# Patient Record
Sex: MALE | Race: WHITE | NOT HISPANIC OR LATINO | Employment: OTHER | URBAN - NONMETROPOLITAN AREA
[De-identification: names, ages, dates, MRNs, and addresses within clinical notes are randomized per-mention and may not be internally consistent; named-entity substitution may affect disease eponyms.]

---

## 2022-01-07 ENCOUNTER — OFFICE VISIT (OUTPATIENT)
Dept: INTERNAL MEDICINE | Facility: CLINIC | Age: 72
End: 2022-01-07

## 2022-01-07 VITALS
OXYGEN SATURATION: 98 % | BODY MASS INDEX: 33.34 KG/M2 | HEART RATE: 81 BPM | HEIGHT: 68 IN | SYSTOLIC BLOOD PRESSURE: 148 MMHG | DIASTOLIC BLOOD PRESSURE: 78 MMHG | TEMPERATURE: 98.6 F | WEIGHT: 220 LBS | RESPIRATION RATE: 16 BRPM

## 2022-01-07 DIAGNOSIS — J02.9 PHARYNGITIS, UNSPECIFIED ETIOLOGY: ICD-10-CM

## 2022-01-07 DIAGNOSIS — R05.9 COUGH: Primary | ICD-10-CM

## 2022-01-07 PROCEDURE — 99203 OFFICE O/P NEW LOW 30 MIN: CPT | Performed by: NURSE PRACTITIONER

## 2022-01-07 RX ORDER — INSULIN LISPRO 100 [IU]/ML
INJECTION, SOLUTION INTRAVENOUS; SUBCUTANEOUS
COMMUNITY

## 2022-01-07 RX ORDER — INSULIN GLARGINE 300 U/ML
INJECTION, SOLUTION SUBCUTANEOUS
COMMUNITY

## 2022-01-07 RX ORDER — POTASSIUM CHLORIDE 750 MG/1
TABLET, EXTENDED RELEASE ORAL
COMMUNITY

## 2022-01-07 RX ORDER — LOSARTAN POTASSIUM 50 MG/1
TABLET ORAL
COMMUNITY

## 2022-01-07 RX ORDER — LEVOFLOXACIN 500 MG/1
500 TABLET, FILM COATED ORAL DAILY
Qty: 7 TABLET | Refills: 0 | Status: SHIPPED | OUTPATIENT
Start: 2022-01-07

## 2022-01-07 RX ORDER — FUROSEMIDE 40 MG/1
TABLET ORAL
COMMUNITY

## 2022-01-07 RX ORDER — CHOLECALCIFEROL (VITAMIN D3) 1250 MCG
CAPSULE ORAL
COMMUNITY

## 2022-01-07 RX ORDER — LEVOTHYROXINE SODIUM 0.12 MG/1
TABLET ORAL
COMMUNITY

## 2022-01-07 RX ORDER — FLASH GLUCOSE SCANNING READER
EACH MISCELLANEOUS
COMMUNITY

## 2022-01-07 RX ORDER — ROSUVASTATIN CALCIUM 40 MG/1
TABLET, COATED ORAL
COMMUNITY

## 2022-01-07 NOTE — PROGRESS NOTES
"        Subjective     Chief Complaint   Patient presents with   • Cough       History of Present Illness  Pt comes in today with a cough. States cough has been happening 1.5 weeks. States mostly dry cough. Has not taken any medications. This has been a recurrent cough for years. States has not been tested for COVID. Usually sees Dr. Curran for his primary care. No ear pain. No fever. No chills. For the past 2 days, his blood sugar has been \"crazy.\"      Review of Systems   Otherwise complete ROS reviewed and negative except as mentioned in the HPI.    Past Medical History:   Past Medical History:   Diagnosis Date   • Diabetes mellitus (HCC)    • Hyperlipidemia    • Hypertension    • Hypothyroidism      Past Surgical History:History reviewed. No pertinent surgical history.  Social History:  reports that he has never smoked. He has never used smokeless tobacco. He reports current alcohol use. He reports that he does not use drugs.    Family History: family history is not on file.      Allergies:  No Known Allergies  Medications:  Prior to Admission medications    Medication Sig Start Date End Date Taking? Authorizing Provider   Cholecalciferol (Vitamin D3) 1.25 MG (42031 UT) capsule Vitamin D3   daily   Yes Priyanka Ragland MD   Continuous Blood Gluc  (FreeStyle Selvin 14 Day [place]) device FreeStyle Selvin 14 Day [place]   USE AS DIRECTED   Yes rPiyanka Ragland MD   furosemide (LASIX) 40 MG tablet furosemide 40 mg tablet   TAKE ONE TABLET BY MOUTH DAILY   Yes Priyanka Ragland MD   Insulin Glargine, 2 Unit Dial, (Toujeo Max SoloStar) 300 UNIT/ML solution pen-injector injection Toujeo Max U-300 SoloStar 300 unit/mL (3 mL) subcutaneous insulin pen   INJECT 48 UNITS EVERY MORNING   Yes Priyanka Ragland MD   Insulin Lispro, 1 Unit Dial, (HumaLOG KwikPen) 100 UNIT/ML solution pen-injector Humalog KwikPen (U-100) Insulin 100 unit/mL subcutaneous   inject 14 UNITS UNDER THE SKIN AT breakfast, 12 " "UNITS AT LUNCH, AND 20 UNITS AT SUPPER   Yes ProviderPriyanka MD   levothyroxine (SYNTHROID, LEVOTHROID) 125 MCG tablet levothyroxine 125 mcg tablet   TAKE ONE TABLET BY MOUTH EVERY MORNING ON EMPTY STOMACH   Yes Priyanka Ragland MD   losartan (COZAAR) 50 MG tablet losartan 50 mg tablet   TAKE ONE TABLET BY MOUTH EVERY DAY   Yes Priyanka Ragland MD   potassium chloride (K-DUR,KLOR-CON) 10 MEQ CR tablet potassium chloride ER 10 mEq tablet,extended release(part/cryst)   TAKE ONE TABLET BY MOUTH DAILY   Yes Priyanka Ragland MD   rosuvastatin (CRESTOR) 40 MG tablet rosuvastatin 40 mg tablet   TAKE ONE TABLET BY MOUTH DAILY AT BEDTIME   Yes Priyanka Ragland MD       Objective     Vital Signs: /78 (BP Location: Right arm, Patient Position: Sitting, Cuff Size: Large Adult)   Pulse 81   Temp 98.6 °F (37 °C) (Infrared)   Resp 16   Ht 172.7 cm (68\")   Wt 99.8 kg (220 lb)   SpO2 98%   BMI 33.45 kg/m²   Physical Exam  Vitals reviewed.   Constitutional:       Appearance: He is well-developed.   HENT:      Head: Normocephalic and atraumatic.      Right Ear: Tympanic membrane is bulging.      Left Ear: Tympanic membrane normal.      Mouth/Throat:      Pharynx: Posterior oropharyngeal erythema present.   Eyes:      Pupils: Pupils are equal, round, and reactive to light.   Neck:      Vascular: No JVD.   Cardiovascular:      Rate and Rhythm: Normal rate and regular rhythm.   Pulmonary:      Effort: Pulmonary effort is normal.   Abdominal:      General: Bowel sounds are normal.      Palpations: Abdomen is soft.   Musculoskeletal:         General: No deformity.      Cervical back: Normal range of motion and neck supple.   Lymphadenopathy:      Cervical: No cervical adenopathy.   Skin:     General: Skin is warm and dry.   Neurological:      Mental Status: He is alert and oriented to person, place, and time.   Psychiatric:         Behavior: Behavior normal.         Thought Content: Thought content " normal.         Judgment: Judgment normal.       Results Reviewed:  No results found for: GLUCOSE, BUN, CREATININE, NA, K, CL, CO2, CALCIUM, ALT, AST, WBC, HCT, PLT, CHOL, TRIG, HDL, LDL, LDLHDL, HGBA1C      Assessment / Plan     Assessment/Plan:  Diagnoses and all orders for this visit:    1. Cough (Primary)  -     levoFLOXacin (Levaquin) 500 MG tablet; Take 1 tablet by mouth Daily.  Dispense: 7 tablet; Refill: 0  -     COVID-19,[facility] ROUTINE, NP/OP SWAB IN TRANSPORT MEDIA OR ESWAB 72 HR TAT - Swab, Anterior nasal    2. Pharyngitis, unspecified etiology  -     levoFLOXacin (Levaquin) 500 MG tablet; Take 1 tablet by mouth Daily.  Dispense: 7 tablet; Refill: 0  -     COVID-19,[facility] ROUTINE, NP/OP SWAB IN TRANSPORT MEDIA OR ESWAB 72 HR TAT - Swab, Anterior nasal      Return if symptoms worsen or fail to improve. unless patient needs to be seen sooner or acute issues arise.    I have discussed the patient results/orders and and plan/recommendation with them at today's visit.      Emma White, APRN   01/07/2022

## 2022-01-10 LAB
LABCORP SARS-COV-2, NAA 2 DAY TAT: NORMAL
SARS-COV-2 RNA RESP QL NAA+PROBE: NOT DETECTED

## 2022-01-10 NOTE — PROGRESS NOTES
Called pt with results. Pt voiced understanding. Pt states he is much better and wanted to thank you for seeing him.

## 2022-03-08 RX ORDER — POTASSIUM CHLORIDE 750 MG/1
10 TABLET, EXTENDED RELEASE ORAL 2 TIMES DAILY
Qty: 60 TABLET | Refills: 0 | Status: SHIPPED | OUTPATIENT
Start: 2022-03-08

## 2022-03-08 NOTE — TELEPHONE ENCOUNTER
Patient is a pervious Dr Kody Mooney patient, he is scheduled to see Dr Jordyn Galvez in April and is going to run out of his potassium prior to his appointment. Dr Jordyn Galvez stated he is agreeable to a 30 day supply to his pharmacy to get him to his appointment.      Last OV Visit date not found  Next OV 4/13/2022      Requested Prescriptions     Pending Prescriptions Disp Refills    potassium chloride (KLOR-CON M) 10 MEQ extended release tablet 60 tablet 0     Sig: Take 1 tablet by mouth 2 times daily

## 2022-03-17 DIAGNOSIS — E11.9 TYPE 2 DIABETES MELLITUS WITHOUT COMPLICATIONS (HCC): ICD-10-CM

## 2022-03-21 RX ORDER — FLASH GLUCOSE SENSOR
KIT MISCELLANEOUS
Qty: 2 EACH | Refills: 5 | Status: SHIPPED | OUTPATIENT
Start: 2022-03-21

## 2022-03-21 NOTE — TELEPHONE ENCOUNTER
Last OV Visit date not found  Next OV 4/13/2022      Requested Prescriptions     Pending Prescriptions Disp Refills    Continuous Blood Gluc Sensor (FREESTYLE DONG 14 DAY SENSOR) MISC [Pharmacy Med Name: FreeStyle Dong 14 Day Sensor kit]  0     Sig: CHANGE SENSOR EVERY 14 DAYS

## 2022-04-06 ENCOUNTER — TELEPHONE (OUTPATIENT)
Dept: INTERNAL MEDICINE | Facility: CLINIC | Age: 72
End: 2022-04-06

## 2022-04-06 NOTE — TELEPHONE ENCOUNTER
Attempted to call pt wife to see if pt was going to establish care with us. Left VM to return call.